# Patient Record
Sex: FEMALE | Race: BLACK OR AFRICAN AMERICAN | NOT HISPANIC OR LATINO | URBAN - METROPOLITAN AREA
[De-identification: names, ages, dates, MRNs, and addresses within clinical notes are randomized per-mention and may not be internally consistent; named-entity substitution may affect disease eponyms.]

---

## 2021-05-28 ENCOUNTER — OFFICE VISIT (OUTPATIENT)
Dept: URBAN - METROPOLITAN AREA TELEHEALTH 2 | Facility: TELEHEALTH | Age: 71
End: 2021-05-28
Payer: MEDICARE

## 2021-05-28 VITALS — TEMPERATURE: 96 F | BODY MASS INDEX: 28.32 KG/M2 | WEIGHT: 170 LBS | HEIGHT: 65 IN

## 2021-05-28 DIAGNOSIS — Z12.11 ENCOUNTER FOR SCREENING COLONOSCOPY: ICD-10-CM

## 2021-05-28 DIAGNOSIS — K59.9 COLONIC INERTIA: ICD-10-CM

## 2021-05-28 PROCEDURE — 99204 OFFICE O/P NEW MOD 45 MIN: CPT | Performed by: INTERNAL MEDICINE

## 2021-05-28 PROCEDURE — 99244 OFF/OP CNSLTJ NEW/EST MOD 40: CPT | Performed by: INTERNAL MEDICINE

## 2021-05-28 RX ORDER — SODIUM, POTASSIUM,MAG SULFATES 17.5-3.13G
354 ML SOLUTION, RECONSTITUTED, ORAL ORAL ONCE
Qty: 354 ML | Refills: 0 | OUTPATIENT
Start: 2021-05-28 | End: 2021-05-29

## 2021-05-28 NOTE — HPI-TODAY'S VISIT:
Ms. Kelly is a 72 yo F presenting for consultation for colonoscopy and is referred by Maritza Srinivasan. A copy of this report will be sent to Dr. Srinivasan.  She says that she had a subtotal colectomy for chronic constipation in 2018. She had years of constipation requiring laxatives, enemas, and Amitiza which were ineffective. She was having a bowel movement every 1-2 weeks for years with moderate to severe abdominal pain. She now had a bowel movement most days of the week without laxatives since her procedure. She is unsure of how much colon is left.  She has no heartburn, abdominal pain, nausea, vomiting, or rectal pain. No weight loss.   Patient seen today via telehealth by agreement and consent of patient in light of current COVID-19 pandemic. I used video conferencing during the visit. The patient encounter is appropriate and reasonable under the circumstances given the patient's particular presentation at this time. The patient has been advised of the followin) the potential risks and limitations of this mode of treatment (including but not limited to the absence of in-person examination); 2) the right to refuse telehealth services at any point without affecting the right to future care; 3) the right to receive in-person services, included immediately after this consultation if an urgent need arises; 4) information, including identifiable images or information from this telehealth consult, will only be shared in accordance with HIPPA regulations. Any and all of the patient's and/or patient's family member's questions on this issue have been answered. The patient has verbally consented to be treated via telehealth services. The patient has also been advised to contact this office for worsening conditions or problems, and seek emergency medical treatment and/or call 911 if the patient deems either necessary.   More than half of the face-to-face time used for counseling and coordination of care.

## 2021-06-03 ENCOUNTER — OFFICE VISIT (OUTPATIENT)
Dept: URBAN - METROPOLITAN AREA CLINIC 98 | Facility: CLINIC | Age: 71
End: 2021-06-03

## 2021-06-18 ENCOUNTER — OFFICE VISIT (OUTPATIENT)
Dept: URBAN - METROPOLITAN AREA SURGERY CENTER 18 | Facility: SURGERY CENTER | Age: 71
End: 2021-06-18

## 2021-07-09 ENCOUNTER — OFFICE VISIT (OUTPATIENT)
Dept: URBAN - METROPOLITAN AREA TELEHEALTH 2 | Facility: TELEHEALTH | Age: 71
End: 2021-07-09

## 2022-02-03 ENCOUNTER — TELEPHONE ENCOUNTER (OUTPATIENT)
Dept: URBAN - METROPOLITAN AREA CLINIC 98 | Facility: CLINIC | Age: 72
End: 2022-02-03

## 2022-03-11 PROBLEM — 305058001: Status: ACTIVE | Noted: 2022-03-11

## 2022-03-22 ENCOUNTER — ERX REFILL RESPONSE (OUTPATIENT)
Dept: URBAN - METROPOLITAN AREA CLINIC 98 | Facility: CLINIC | Age: 72
End: 2022-03-22

## 2022-03-22 RX ORDER — SODIUM SULFATE, POTASSIUM SULFATE, MAGNESIUM SULFATE 17.5; 3.13; 1.6 G/ML; G/ML; G/ML
TAKE AS DIRECTED SOLUTION, CONCENTRATE ORAL
Qty: 354 MILLILITER | Refills: 0 | OUTPATIENT

## 2022-03-22 RX ORDER — SODIUM SULFATE, POTASSIUM SULFATE, MAGNESIUM SULFATE 17.5; 3.13; 1.6 G/ML; G/ML; G/ML
TAKE AS DIRECTED SOLUTION, CONCENTRATE ORAL
Qty: 354 MILLILITER | Refills: 1 | OUTPATIENT

## 2022-03-29 ENCOUNTER — CLAIMS CREATED FROM THE CLAIM WINDOW (OUTPATIENT)
Dept: URBAN - METROPOLITAN AREA CLINIC 4 | Facility: CLINIC | Age: 72
End: 2022-03-29
Payer: MEDICARE

## 2022-03-29 ENCOUNTER — OFFICE VISIT (OUTPATIENT)
Dept: URBAN - METROPOLITAN AREA SURGERY CENTER 18 | Facility: SURGERY CENTER | Age: 72
End: 2022-03-29
Payer: MEDICARE

## 2022-03-29 DIAGNOSIS — Z12.11 COLON CANCER SCREENING: ICD-10-CM

## 2022-03-29 DIAGNOSIS — K63.3 APHTHOUS ULCER OF COLON: ICD-10-CM

## 2022-03-29 DIAGNOSIS — K63.3 ULCER OF INTESTINE: ICD-10-CM

## 2022-03-29 DIAGNOSIS — K63.89 BACTERIAL OVERGROWTH SYNDROME: ICD-10-CM

## 2022-03-29 PROCEDURE — 88342 IMHCHEM/IMCYTCHM 1ST ANTB: CPT | Performed by: PATHOLOGY

## 2022-03-29 PROCEDURE — 45331 SIGMOIDOSCOPY AND BIOPSY: CPT | Performed by: INTERNAL MEDICINE

## 2022-03-29 PROCEDURE — G8907 PT DOC NO EVENTS ON DISCHARG: HCPCS | Performed by: INTERNAL MEDICINE

## 2022-03-29 PROCEDURE — 88341 IMHCHEM/IMCYTCHM EA ADD ANTB: CPT | Performed by: PATHOLOGY

## 2022-03-29 PROCEDURE — 88305 TISSUE EXAM BY PATHOLOGIST: CPT | Performed by: PATHOLOGY

## 2022-03-29 RX ORDER — SODIUM SULFATE, POTASSIUM SULFATE, MAGNESIUM SULFATE 17.5; 3.13; 1.6 G/ML; G/ML; G/ML
TAKE AS DIRECTED SOLUTION, CONCENTRATE ORAL
Qty: 354 MILLILITER | Refills: 1 | Status: ACTIVE | COMMUNITY

## 2023-02-06 ENCOUNTER — WEB ENCOUNTER (OUTPATIENT)
Dept: URBAN - METROPOLITAN AREA CLINIC 96 | Facility: CLINIC | Age: 73
End: 2023-02-06

## 2023-02-06 ENCOUNTER — OFFICE VISIT (OUTPATIENT)
Dept: URBAN - METROPOLITAN AREA CLINIC 96 | Facility: CLINIC | Age: 73
End: 2023-02-06
Payer: MEDICARE

## 2023-02-06 VITALS
DIASTOLIC BLOOD PRESSURE: 77 MMHG | TEMPERATURE: 98 F | SYSTOLIC BLOOD PRESSURE: 144 MMHG | HEART RATE: 62 BPM | WEIGHT: 174 LBS | BODY MASS INDEX: 28.99 KG/M2 | HEIGHT: 65 IN

## 2023-02-06 DIAGNOSIS — R53.83 TIRED: ICD-10-CM

## 2023-02-06 DIAGNOSIS — Z90.49 HISTORY OF COLECTOMY: ICD-10-CM

## 2023-02-06 DIAGNOSIS — K59.09 CHRONIC CONSTIPATION: ICD-10-CM

## 2023-02-06 DIAGNOSIS — D50.9 IRON DEFICIENCY ANEMIA, UNSPECIFIED IRON DEFICIENCY ANEMIA TYPE: ICD-10-CM

## 2023-02-06 PROBLEM — 427816007: Status: ACTIVE | Noted: 2023-02-06

## 2023-02-06 PROCEDURE — 99214 OFFICE O/P EST MOD 30 MIN: CPT | Performed by: INTERNAL MEDICINE

## 2023-02-06 NOTE — HPI-TODAY'S VISIT:
72 y.o. AAF Sent by Dr. Srinivasan b/c iron has dropped Still has heart palpitations - seen cardiologist - tests came back ok No N/V Bowels normal - not dark No abd pain No N/V No wt loss Using Iron from [employer]; avoiding stronger b/c of constipation Bowels 1-2 times / week -- better than prior to surgery -- surgery was a plus -- no longer need enemas No blood in stool No blood in urine No vag bleeding

## 2023-02-07 LAB
A/G RATIO: 1.6
ABSOLUTE BASOPHILS: 33
ABSOLUTE EOSINOPHILS: 98
ABSOLUTE LYMPHOCYTES: 1957
ABSOLUTE MONOCYTES: 546
ABSOLUTE NEUTROPHILS: 3868
ALBUMIN: 4.2
ALKALINE PHOSPHATASE: 51
ALT (SGPT): 12
AST (SGOT): 17
BASOPHILS: 0.5
BILIRUBIN, TOTAL: 0.5
BUN/CREATININE RATIO: 13
BUN: 13
CALCIUM: 9.5
CARBON DIOXIDE, TOTAL: 24
CHLORIDE: 105
CREATININE: 1.01
EGFR: 59
EOSINOPHILS: 1.5
FERRITIN, SERUM: 9
GLOBULIN, TOTAL: 2.6
GLUCOSE: 80
HEMATOCRIT: 33
HEMOGLOBIN: 10.2
IRON BIND.CAP.(TIBC): 436
IRON SATURATION: 8
IRON: 35
LYMPHOCYTES: 30.1
MCH: 25.6
MCHC: 30.9
MCV: 82.7
MONOCYTES: 8.4
MPV: 10.3
NEUTROPHILS: 59.5
PLATELET COUNT: 348
POTASSIUM: 4.5
PROTEIN, TOTAL: 6.8
RDW: 20.8
RED BLOOD CELL COUNT: 3.99
SODIUM: 139
VITAMIN B12: 787
VITAMIN D,25-OH,TOTAL,IA: 38
WHITE BLOOD CELL COUNT: 6.5

## 2023-02-08 ENCOUNTER — TELEPHONE ENCOUNTER (OUTPATIENT)
Dept: URBAN - METROPOLITAN AREA CLINIC 23 | Facility: CLINIC | Age: 73
End: 2023-02-08

## 2023-02-17 PROBLEM — 87522002: Status: ACTIVE | Noted: 2023-02-06

## 2023-03-31 ENCOUNTER — CLAIMS CREATED FROM THE CLAIM WINDOW (OUTPATIENT)
Dept: URBAN - METROPOLITAN AREA CLINIC 4 | Facility: CLINIC | Age: 73
End: 2023-03-31
Payer: MEDICARE

## 2023-03-31 ENCOUNTER — OFFICE VISIT (OUTPATIENT)
Dept: URBAN - METROPOLITAN AREA SURGERY CENTER 18 | Facility: SURGERY CENTER | Age: 73
End: 2023-03-31
Payer: MEDICARE

## 2023-03-31 DIAGNOSIS — K31.89 ACQUIRED DEFORMITY OF DUODENUM: ICD-10-CM

## 2023-03-31 DIAGNOSIS — D50.9 ANEMIA: ICD-10-CM

## 2023-03-31 DIAGNOSIS — K31.89 OTHER DISEASES OF STOMACH AND DUODENUM: ICD-10-CM

## 2023-03-31 DIAGNOSIS — K29.70 GASTRITIS, UNSPECIFIED, WITHOUT BLEEDING: ICD-10-CM

## 2023-03-31 PROBLEM — 724556004: Status: ACTIVE | Noted: 2023-03-31

## 2023-03-31 PROCEDURE — 88312 SPECIAL STAINS GROUP 1: CPT | Performed by: PATHOLOGY

## 2023-03-31 PROCEDURE — G8907 PT DOC NO EVENTS ON DISCHARG: HCPCS | Performed by: INTERNAL MEDICINE

## 2023-03-31 PROCEDURE — 88305 TISSUE EXAM BY PATHOLOGIST: CPT | Performed by: PATHOLOGY

## 2023-03-31 PROCEDURE — 43239 EGD BIOPSY SINGLE/MULTIPLE: CPT | Performed by: INTERNAL MEDICINE

## 2023-04-05 ENCOUNTER — WEB ENCOUNTER (OUTPATIENT)
Dept: URBAN - METROPOLITAN AREA CLINIC 35 | Facility: CLINIC | Age: 73
End: 2023-04-05

## 2023-05-12 ENCOUNTER — OFFICE VISIT (OUTPATIENT)
Dept: URBAN - METROPOLITAN AREA TELEHEALTH 2 | Facility: TELEHEALTH | Age: 73
End: 2023-05-12

## 2023-05-12 VITALS — BODY MASS INDEX: 29.16 KG/M2 | HEIGHT: 65 IN | WEIGHT: 175 LBS

## 2023-05-19 ENCOUNTER — OFFICE VISIT (OUTPATIENT)
Dept: URBAN - METROPOLITAN AREA TELEHEALTH 2 | Facility: TELEHEALTH | Age: 73
End: 2023-05-19
Payer: MEDICARE

## 2023-05-19 ENCOUNTER — DASHBOARD ENCOUNTERS (OUTPATIENT)
Age: 73
End: 2023-05-19

## 2023-05-19 VITALS — BODY MASS INDEX: 28.99 KG/M2 | HEIGHT: 65 IN | WEIGHT: 174 LBS

## 2023-05-19 DIAGNOSIS — D50.8 ACQUIRED IRON DEFICIENCY ANEMIA DUE TO DECREASED ABSORPTION: ICD-10-CM

## 2023-05-19 PROCEDURE — 99213 OFFICE O/P EST LOW 20 MIN: CPT | Performed by: PHYSICIAN ASSISTANT

## 2023-05-19 NOTE — HPI-TODAY'S VISIT:
Pt with hx of anemia - in Feb her iron was 35, ferritin 9, Hgb 10.2, Hct 35 Had recent colon had egd 3/2023 -- reactive gastropathy Pt ended up getting the iv iron - helped her a lot has had blood work done since then and her iron was up to 80 per the patient she has an appt with Dr. Srinivasan this summer sees Dr. Segal in [place]